# Patient Record
Sex: FEMALE | URBAN - METROPOLITAN AREA
[De-identification: names, ages, dates, MRNs, and addresses within clinical notes are randomized per-mention and may not be internally consistent; named-entity substitution may affect disease eponyms.]

---

## 2023-11-17 ENCOUNTER — OFFICE VISIT (OUTPATIENT)
Dept: URBAN - METROPOLITAN AREA CLINIC 19 | Facility: CLINIC | Age: 68
End: 2023-11-17
Payer: COMMERCIAL

## 2023-11-17 ENCOUNTER — LAB OUTSIDE AN ENCOUNTER (OUTPATIENT)
Dept: URBAN - METROPOLITAN AREA CLINIC 19 | Facility: CLINIC | Age: 68
End: 2023-11-17

## 2023-11-17 ENCOUNTER — DASHBOARD ENCOUNTERS (OUTPATIENT)
Age: 68
End: 2023-11-17

## 2023-11-17 VITALS
HEART RATE: 58 BPM | TEMPERATURE: 98.3 F | DIASTOLIC BLOOD PRESSURE: 70 MMHG | SYSTOLIC BLOOD PRESSURE: 112 MMHG | HEIGHT: 62 IN | WEIGHT: 145.8 LBS | BODY MASS INDEX: 26.83 KG/M2

## 2023-11-17 DIAGNOSIS — K52.89 (LYMPHOCYTIC) MICROSCOPIC COLITIS: ICD-10-CM

## 2023-11-17 DIAGNOSIS — K21.9 GASTRIC REFLUX: ICD-10-CM

## 2023-11-17 DIAGNOSIS — R13.19 ESOPHAGEAL DYSPHAGIA: ICD-10-CM

## 2023-11-17 PROCEDURE — 99244 OFF/OP CNSLTJ NEW/EST MOD 40: CPT | Performed by: NURSE PRACTITIONER

## 2023-11-17 PROCEDURE — 99204 OFFICE O/P NEW MOD 45 MIN: CPT | Performed by: NURSE PRACTITIONER

## 2023-11-17 RX ORDER — SERTRALINE 50 MG/1
1 TABLET TABLET, FILM COATED ORAL ONCE A DAY
Status: ACTIVE | COMMUNITY

## 2023-11-17 RX ORDER — PANTOPRAZOLE SODIUM 40 MG/1
1 TABLET TABLET, DELAYED RELEASE ORAL ONCE A DAY
Status: ACTIVE | COMMUNITY

## 2023-11-17 RX ORDER — BUPROPION HYDROCHLORIDE 300 MG/1
1 TABLET IN THE MORNING TABLET, EXTENDED RELEASE ORAL ONCE A DAY
Status: ACTIVE | COMMUNITY

## 2023-11-17 RX ORDER — LEVOTHYROXINE SODIUM 75 UG/1
1 TABLET IN THE MORNING ON AN EMPTY STOMACH TABLET ORAL ONCE A DAY
Status: ACTIVE | COMMUNITY

## 2023-11-17 RX ORDER — PROPRANOLOL HYDROCHLORIDE 80 MG/1
1 TABLET TABLET ORAL TWICE A DAY
Status: ACTIVE | COMMUNITY

## 2023-11-17 RX ORDER — ATORVASTATIN CALCIUM 20 MG/1
1 TABLET TABLET, FILM COATED ORAL ONCE A DAY
Status: ACTIVE | COMMUNITY

## 2023-11-17 RX ORDER — ETODOLAC 300 MG/1
1 CAPSULE WITH FOOD CAPSULE ORAL TWICE A DAY
Status: ACTIVE | COMMUNITY

## 2023-11-17 NOTE — HPI-TODAY'S VISIT:
Ms. Smith is a 67-year-old female with PMH of hypothyroid, prediabetes, HTN/HLD, anxiety/depression who presents today for chronic diarrhea.  Sent upon referral from Dr. Lloyd Lang. A copy of this report will be sent to the referring provider.   Reports onset when she was in high school but progressively worse over the years. 2 weeks ago so bad she would wake from fecal incontinence. Incontinence has occurred occasionally about once every few months over past year. 1-4 episodes in a day. Occasionally has normal BMs. Sugar makes it worse. Ice cream with chocolate triggered severe diarrhea for a few days.  Occasional dysphagia about once a month she attributes to a migraine med she was taking. Was told she had reflux in the past, was put on pantoprazole. Has been taking it past few years. Has recently been nauseated but no vomiting. Never had an EGD.  Recent labs from 10/4/2023: WBC 6.3, Hg 13.0, Plt 303, TSH 3.205, vitD 32.2, CMP unremarkable  Last colonoscopy was with Dr. Neff 8/11/2021 - 6mm semi-sessile polyp at the splenic flexure. Non-bleeding internal hemorrhoids. 5 yr f/u given.

## 2023-11-20 LAB
C-REACTIVE PROTEIN, QUANT: 2.2
IMMUNOGLOBULIN A, QN, SERUM: 278
INTERPRETATION: (no result)
SED RATE BY MODIFIED: 19
T-TRANSGLUTAMINASE (TTG) IGA: <1

## 2024-01-17 ENCOUNTER — OFFICE VISIT (OUTPATIENT)
Dept: URBAN - METROPOLITAN AREA SURGERY CENTER 31 | Facility: SURGERY CENTER | Age: 69
End: 2024-01-17